# Patient Record
Sex: MALE | Race: WHITE | URBAN - METROPOLITAN AREA
[De-identification: names, ages, dates, MRNs, and addresses within clinical notes are randomized per-mention and may not be internally consistent; named-entity substitution may affect disease eponyms.]

---

## 2018-05-02 ENCOUNTER — OFFICE VISIT (OUTPATIENT)
Dept: FAMILY MEDICINE CLINIC | Age: 48
End: 2018-05-02

## 2018-05-02 VITALS
RESPIRATION RATE: 20 BRPM | HEIGHT: 60 IN | TEMPERATURE: 98.3 F | BODY MASS INDEX: 38.68 KG/M2 | DIASTOLIC BLOOD PRESSURE: 80 MMHG | OXYGEN SATURATION: 97 % | HEART RATE: 75 BPM | WEIGHT: 197 LBS | SYSTOLIC BLOOD PRESSURE: 134 MMHG

## 2018-05-02 DIAGNOSIS — G89.29 CHRONIC PAIN OF LEFT KNEE: Primary | ICD-10-CM

## 2018-05-02 DIAGNOSIS — M25.562 CHRONIC PAIN OF LEFT KNEE: Primary | ICD-10-CM

## 2018-05-02 PROBLEM — E66.01 OBESITY, MORBID (HCC): Status: ACTIVE | Noted: 2018-05-02

## 2018-05-02 RX ORDER — ALPRAZOLAM 2 MG/1
TABLET ORAL
Refills: 0 | COMMUNITY
Start: 2018-03-18 | End: 2018-05-02 | Stop reason: ALTCHOICE

## 2018-05-02 RX ORDER — HYDROCODONE BITARTRATE AND ACETAMINOPHEN 7.5; 325 MG/1; MG/1
1 TABLET ORAL
Qty: 14 TAB | Refills: 0 | Status: SHIPPED | OUTPATIENT
Start: 2018-05-02

## 2018-05-02 NOTE — MR AVS SNAPSHOT
[facility] 
 
 
 [address] [facility] [place] [address] [address] [facility] 
[phone] Patient: Isrrael Coffman MRN: [id number] AWW:[id number] Visit Information Date & Time Provider Department Dept. Phone Encounter #  
 5/2/2018  2:00 PM Gurpreet Philippe, [address] [place] [address] [phone] [id number] Upcoming Health Maintenance Date Due DTaP/Tdap/Td series (1 - Tdap) 7/3/1992 Influenza Age 5 to Adult 8/1/2018 Allergies as of 5/2/2018  Review Complete On: 5/2/2018 By: Gurpreet Philippe MD  
 No Known Allergies Current Immunizations  Never Reviewed No immunizations on file. Not reviewed this visit You Were Diagnosed With   
  
 Codes Comments Chronic pain of left knee    -  Primary ICD-10-CM: M25.562, U11.53 ICD-9-CM: 719.46, 338.29 Vitals BP Pulse Temp Resp Height(growth percentile) Weight(growth percentile) 134/80 75 98.3 °F (36.8 °C) (Oral) 20 4' 10\" (1.473 m) 197 lb (89.4 kg) SpO2 BMI Smoking Status 97% 41.17 kg/m2 Never Smoker Vitals History BMI and BSA Data Body Mass Index Body Surface Area  
 41.17 kg/m 2 1.91 m 2 Preferred Pharmacy Pharmacy Name Phone [facility] [address] [facility] [phone] Your Updated Medication List  
  
   
This list is accurate as of 5/2/18  2:51 PM.  Always use your most recent med list.  
  
  
  
  
 HYDROcodone-acetaminophen 7.5-325 mg per tablet Commonly known as:  Cierra Kike Take 1 Tab by mouth every twelve (12) hours as needed for Pain. Max Daily Amount: 2 Tabs. Prescriptions Printed Refills HYDROcodone-acetaminophen (NORCO) 7.5-325 mg per tablet 0 Sig: Take 1 Tab by mouth every twelve (12) hours as needed for Pain. Max Daily Amount: 2 Tabs. Class: Print Route: Oral  
  
Introducing [facility]!    
 Kip Diez introduces [employer] patient portal. Now you can access parts of your medical record, email your doctor's office, and request medication refills online. 1. In your internet browser, go to [email or web address] 2. Click on the First Time User? Click Here link in the Sign In box. You will see the New Member Sign Up page. 3. Enter your Deanslist Access Code exactly as it appears below. You will not need to use this code after youve completed the sign-up process. If you do not sign up before the expiration date, you must request a new code. · Deanslist Access Code: [email or web address] Expires: 7/31/2018  2:05 PM 
 
4. Enter the last four digits of your Social Security Number (xxxx) and Date of Birth (mm/dd/yyyy) as indicated and click Submit. You will be taken to the next sign-up page. 5. Create a Deanslist ID. This will be your Deanslist login ID and cannot be changed, so think of one that is secure and easy to remember. 6. Create a Deanslist password. You can change your password at any time. 7. Enter your Password Reset Question and Answer. This can be used at a later time if you forget your password. 8. Enter your e-mail address. You will receive e-mail notification when new information is available in [address] 9. Click Sign Up. You can now view and download portions of your medical record. 10. Click the Download Summary menu link to download a portable copy of your medical information. If you have questions, please visit the Frequently Asked Questions section of the Deanslist website. Remember, Deanslist is NOT to be used for urgent needs. For medical emergencies, dial 911. Now available from your iPhone and Android! Please provide this summary of care documentation to your next provider. Your primary care clinician is listed as Adriana Mcpherson. If you have any questions after today's visit, please call [phone].

## 2018-05-02 NOTE — PROGRESS NOTES
Chief Complaint   Patient presents with    Well Male     1. Have you been to the ER, urgent care clinic since your last visit? Hospitalized since your last visit? No    2. Have you seen or consulted any other health care providers outside of the [address] [place] [address] since your last visit? Include any pap smears or colon screening. No      Subjective:   Pernell Rincon is a 56 y.o. male presenting for his    Pt has had chronic knee pain; he has had surgery on the knee; He has been on chronic vicodin, xanax and an nflammatory. He takes tylenol and ibuprofen; He does not desire surgery; His last xanax rx was filled 3/18/2018; with a disp qty of #60 ; He has been using his mom's xanax. His blood pressure is up; he thinks this is due to     ROS: No dyspnea or chest pain on exertion. No abdominal pain, change in bowel habits, black or bloody stools. No urinary tract or prostatic symptoms. No neurological complaints. Specific concerns today: wants his meds as prescribed by his previous MD ; Pt advised that this provider could not prescribe both xanax and narcotics. Advised that a pain management referral would be in order;. Risk of benzodiazepines and narcotic meds reviewed. Patient Active Problem List    Diagnosis Date Noted    Obesity, morbid ([facility] [address] 05/02/2018    Anxiety     Testicle pain 05/04/2015    Penile lesion 05/04/2015    Left knee pain      Current Outpatient Prescriptions   Medication Sig Dispense Refill    HYDROcodone-acetaminophen (NORCO) 7.5-325 mg per tablet Take 1 Tab by mouth every twelve (12) hours as needed for Pain. Max Daily Amount: 2 Tabs.  14 Tab 0     No Known Allergies  Past Medical History:   Diagnosis Date    Anxiety     Hypercholesterolemia     on diet    Left knee pain     Obesity     Osteoarthritis of left knee      Past Surgical History:   Procedure Laterality Date    HX OSTEOTOMY  2007    High tibial osteotomy with external fixtion     Family History   Problem Relation Age of Onset    Diabetes Father             Objective:     Visit Vitals    /80    Pulse 75    Temp 98.3 °F (36.8 °C) (Oral)    Resp 20    Ht 4' 10\" (1.473 m)    Wt 197 lb (89.4 kg)    SpO2 97%    BMI 41.17 kg/m2     The patient appears well, alert, oriented x 3, in no distress. ANTONIA. Lungs are clear, good air entry, no wheezes, rhonchi or rales. S1 and S2 normal, no murmurs, regular rate and rhythm  Extremities show no edema,. Neurological is normal without gross focal findings. Assessment/Plan:     . ICD-10-CM ICD-9-CM    1. Chronic pain of left knee M25.562 719.46 HYDROcodone-acetaminophen (NORCO) 7.5-325 mg per tablet    G89.29 338.29 REFERRAL TO PAIN MANAGEMENT   . As above, new to this provider  Pt has elected to use norco; temporary only rx given. No benzodiazepine prescribed today' advised pt not take benzodiazepine with norco  Pain management referral  Follow-up Disposition:  Return if symptoms worsen or fail to improve.

## 2022-03-19 PROBLEM — E66.01 OBESITY, MORBID (HCC): Status: ACTIVE | Noted: 2018-05-02

## 2022-12-10 ENCOUNTER — HOSPITAL ENCOUNTER (EMERGENCY)
Age: 52
Discharge: HOME | End: 2022-12-10
Attending: EMERGENCY MEDICINE

## 2022-12-10 VITALS
WEIGHT: 230 LBS | OXYGEN SATURATION: 98 % | TEMPERATURE: 97.4 F | SYSTOLIC BLOOD PRESSURE: 152 MMHG | DIASTOLIC BLOOD PRESSURE: 69 MMHG | HEIGHT: 68 IN | HEART RATE: 61 BPM | BODY MASS INDEX: 34.86 KG/M2 | RESPIRATION RATE: 18 BRPM

## 2022-12-10 DIAGNOSIS — K08.89 PAIN, DENTAL: Primary | ICD-10-CM

## 2022-12-10 PROCEDURE — 74011250637 HC RX REV CODE- 250/637: Performed by: EMERGENCY MEDICINE

## 2022-12-10 PROCEDURE — 99283 EMERGENCY DEPT VISIT LOW MDM: CPT

## 2022-12-10 RX ORDER — HYDROCODONE BITARTRATE AND ACETAMINOPHEN 5; 325 MG/1; MG/1
1 TABLET ORAL
Qty: 12 TABLET | Refills: 0 | Status: SHIPPED | OUTPATIENT
Start: 2022-12-10 | End: 2022-12-13

## 2022-12-10 RX ORDER — HYDROCODONE BITARTRATE AND ACETAMINOPHEN 5; 325 MG/1; MG/1
2 TABLET ORAL
Status: COMPLETED | OUTPATIENT
Start: 2022-12-10 | End: 2022-12-10

## 2022-12-10 RX ORDER — PENICILLIN V POTASSIUM 500 MG/1
500 TABLET, FILM COATED ORAL 4 TIMES DAILY
Qty: 28 TABLET | Refills: 0 | Status: SHIPPED | OUTPATIENT
Start: 2022-12-10 | End: 2022-12-17

## 2022-12-10 RX ORDER — IBUPROFEN 600 MG/1
600 TABLET ORAL
Qty: 20 TABLET | Refills: 0 | Status: SHIPPED | OUTPATIENT
Start: 2022-12-10

## 2022-12-10 RX ORDER — PENICILLIN V POTASSIUM 250 MG/1
500 TABLET, FILM COATED ORAL
Status: COMPLETED | OUTPATIENT
Start: 2022-12-10 | End: 2022-12-10

## 2022-12-10 RX ADMIN — PENICILLIN V POTASSIUM 500 MG: 250 TABLET, FILM COATED ORAL at 07:58

## 2022-12-10 RX ADMIN — HYDROCODONE BITARTRATE AND ACETAMINOPHEN 2 TABLET: 5; 325 TABLET ORAL at 07:58

## 2022-12-10 NOTE — ED NOTES
Pt medicated per [facility], discharge instructions provided. Pt denies any questions.

## 2022-12-10 NOTE — ED PROVIDER NOTES
The patient is a 30-year-old male who denies any past medical history, who presents the ED today with dental pain. He has pain in his right upper jaw. It has been present for the past 2 to 3 days. He states that it has been present for the past 2 to 3 days but continues to get worse. He does have a dentist appointment on Monday but the pain was severe enough that he needed to go to the ED today. Past Medical History:   Diagnosis Date    Anxiety     Hypercholesterolemia     on diet    Left knee pain     Obesity     Osteoarthritis of left knee        Past Surgical History:   Procedure Laterality Date    HX OSTEOTOMY  2007    High tibial osteotomy with external fixtion         Family History:   Problem Relation Age of Onset    Diabetes Father        Social History     Socioeconomic History    Marital status: SINGLE     Spouse name: Not on file    Number of children: Not on file    Years of education: Not on file    Highest education level: Not on file   Occupational History    Not on file   Tobacco Use    Smoking status: Never    Smokeless tobacco: Not on file   Substance and Sexual Activity    Alcohol use: No    Drug use: Yes     Types: IV    Sexual activity: Not on file   Other Topics Concern    Not on file   Social History Narrative    Not on file     Social Determinants of Health     Financial Resource Strain: Not on file   Food Insecurity: Not on file   Transportation Needs: Not on file   Physical Activity: Not on file   Stress: Not on file   Social Connections: Not on file   Intimate Partner Violence: Not on file   Housing Stability: Not on file         ALLERGIES: Patient has no known allergies. Review of Systems   All other systems reviewed and are negative. Vitals:    12/10/22 0644   BP: (!) 152/69   Pulse: 61   Resp: 18   Temp: 97.4 °F (36.3 °C)   SpO2: 98%   Weight: 104.3 kg (230 lb)   Height: 5' 8\" (1.727 m)            Physical Exam  Vitals and nursing note reviewed.    Constitutional: Appearance: Normal appearance. HENT:      Head: Normocephalic and atraumatic. Right Ear: External ear normal.      Left Ear: External ear normal.      Nose: Nose normal.      Mouth/Throat:      Mouth: Mucous membranes are moist.      Pharynx: Oropharynx is clear. Comments: Tooth #1 and tooth #2 are absent. Tooth #3 is fractured. Tooth #3 has tenderness to palpation. There is no surrounding gumline tenderness to palpation or fluctuance. There is no overlying soft tissue tenderness to palpation or erythema. Eyes:      Extraocular Movements: Extraocular movements intact. Conjunctiva/sclera: Conjunctivae normal.      Pupils: Pupils are equal, round, and reactive to light. Cardiovascular:      Rate and Rhythm: Normal rate and regular rhythm. Pulses: Normal pulses. Heart sounds: Normal heart sounds. Pulmonary:      Effort: Pulmonary effort is normal.      Breath sounds: Normal breath sounds. Abdominal:      General: Abdomen is flat. Bowel sounds are normal.      Palpations: Abdomen is soft. Musculoskeletal:         General: Normal range of motion. Cervical back: Normal range of motion and neck supple. Skin:     General: Skin is warm and dry. Capillary Refill: Capillary refill takes less than 2 seconds. Neurological:      General: No focal deficit present. Mental Status: He is alert and oriented to person, place, and time. Psychiatric:         Mood and Affect: Mood normal.         Behavior: Behavior normal.         Thought Content: Thought content normal.         Judgment: Judgment normal.        MDM  Number of Diagnoses or Management Options  Diagnosis management comments: The patient is a 60-year-old male who denies any past medical history, who presents to the ED today with right upper jaw dental pain. I believe that tooth #3 is the culprit. He has received Norco and penicillin in the ED.   He will be discharged home with prescriptions for Norco, penicillin, and Motrin. He will be advised to follow-up with a dentist on Monday as previously scheduled. Return precautions have been given.            Procedures

## 2023-10-21 ENCOUNTER — HOSPITAL ENCOUNTER (EMERGENCY)
Facility: HOSPITAL | Age: 53
Discharge: HOME | End: 2023-10-21
Attending: EMERGENCY MEDICINE
Payer: MEDICAID

## 2023-10-21 VITALS
WEIGHT: 240 LBS | TEMPERATURE: 97.7 F | DIASTOLIC BLOOD PRESSURE: 88 MMHG | HEIGHT: 68 IN | SYSTOLIC BLOOD PRESSURE: 145 MMHG | BODY MASS INDEX: 36.37 KG/M2 | OXYGEN SATURATION: 98 % | RESPIRATION RATE: 16 BRPM | HEART RATE: 61 BPM

## 2023-10-21 DIAGNOSIS — H81.10 BENIGN PAROXYSMAL POSITIONAL VERTIGO, UNSPECIFIED LATERALITY: Primary | ICD-10-CM

## 2023-10-21 PROCEDURE — 6370000000 HC RX 637 (ALT 250 FOR IP): Performed by: EMERGENCY MEDICINE

## 2023-10-21 PROCEDURE — 99283 EMERGENCY DEPT VISIT LOW MDM: CPT

## 2023-10-21 RX ORDER — MECLIZINE HCL 12.5 MG/1
25 TABLET ORAL
Status: COMPLETED | OUTPATIENT
Start: 2023-10-21 | End: 2023-10-21

## 2023-10-21 RX ORDER — MECLIZINE HYDROCHLORIDE 25 MG/1
25 TABLET ORAL 3 TIMES DAILY PRN
Qty: 15 TABLET | Refills: 0 | Status: SHIPPED | OUTPATIENT
Start: 2023-10-21 | End: 2023-10-31

## 2023-10-21 RX ADMIN — MECLIZINE 25 MG: 12.5 TABLET ORAL at 19:29

## 2023-10-21 ASSESSMENT — PAIN - FUNCTIONAL ASSESSMENT: PAIN_FUNCTIONAL_ASSESSMENT: NONE - DENIES PAIN

## 2023-10-21 NOTE — ED PROVIDER NOTES
EMERGENCY DEPARTMENT HISTORY AND PHYSICAL EXAM    7:02 PM EDT seen at this time in room F3        Date: 10/21/2023  Patient Name: Nevin García    History of Presenting Illness     No chief complaint on file. History Provided By: patient    Additional History (Context): Nevin García is a 47 y.o. male presents with patient his last encounter for routine medical care was 2008, and has a history of recurrent vertigo. He is never had treatment for it. He now has 3 days of on and off episodes clearly worse with sitting upright, better with lying flat staying still and closing his eyes. He has some discomfort in his left ear but no acute pain does note some subjective hearing loss. No acute change there. Yesterday had some room spinning but no vomiting it subsided this morning has more room spinning with vomiting. He does not recall ever trying meclizine. Not diabetic. Aliyah Mckay PCP: Marsha Vo MD    Chief Complaint:   Duration:    Timing:    Location:   Quality:   Severity:   Modifying Factors:   Associated Symptoms:       No current facility-administered medications for this encounter. Current Outpatient Medications   Medication Sig Dispense Refill    meclizine (ANTIVERT) 25 MG tablet Take 1 tablet by mouth 3 times daily as needed for Dizziness 15 tablet 0    ibuprofen (ADVIL;MOTRIN) 600 MG tablet Take 600 mg by mouth every 8 hours as needed         Past History     Past Medical History:  Past Medical History:   Diagnosis Date    Anxiety     Hypercholesterolemia     on diet    Left knee pain     Obesity     Osteoarthritis of left knee        Past Surgical History:  Past Surgical History:   Procedure Laterality Date    OSTEOTOMY  2007    High tibial osteotomy with external fixtion       Family History:  Family History   Problem Relation Age of Onset    Diabetes Father        Social History:  Social History     Tobacco Use    Smoking status: Never   Substance Use Topics    Alcohol use:  No MG tablet Take 600 mg by mouth every 8 hours as needed             DISCONTINUED MEDICATIONS:  Current Discharge Medication List          PATIENT REFERRED TO:  Follow Up with:  Estephania Bhatia MD  [address] [facility]  [phone]    In 1 week      Yan Timmons MD  [facility], [address] [place] [id number] [address]    In 1 week      Valeria Arciniega MD  [address] [place] [address]  [phone]    In 1 week      _______________________________    Notes:    Cindy Barthel, MD using Dragon dictation      _______________________________     Caleb Flores MD  10/21/23 2010

## 2023-10-22 NOTE — ED NOTES
I have reviewed discharge instructions and medications with patient. Patient verbalized understanding.       Melvena Kanner, RN  10/21/23 2014

## 2024-02-08 NOTE — PROGRESS NOTES
Patient: Silvio Arizmendi                MRN: [id number]       SSN: [id number]  YOB: 1970        AGE: 53 y.o.        SEX: male      PCP: Juana Torres MD  02/09/24    Chief Complaint   Patient presents with    Knee Pain     left     HISTORY:  Silvio Arizmendi is a 53 y.o. male referred by Dr. Alexandra for several year history of left knee pain. He denies any recent injury.  He feels pain with standing, walking and stair climbing.  He experiences startup pain after sitting.  His mostly experiences pain at night. He has to ice his knee when he gets home from work. He has difficulty with bending and straightening his knee. He has not responded to OTC Ibuprofen. He is not interested in surgery because he is on his knees a lot at work as a self-employed .     He notes a prior left knee injury in 2008. He underwent tibial osteotomy with ex fix and ACL reconstruction at [facility] by Dr. Painter in 2008.     Occupation, etc: Mr. 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 He wears kneepads at work. He lives with his girlfriend in [place]. He has 2 adult sons, 1 adult daughter, 2 granddaughters, and 2 grandsons.   Wt Readings from Last 3 Encounters:   02/09/24 104.3 kg (230 lb)   10/21/23 108.9 kg (240 lb)      Body mass index is 34.97 kg/m².    Patient Active Problem List   Diagnosis    Left knee pain    Testicle pain    Penile lesion    Anxiety    Obesity, morbid (HCC)       Social History     Tobacco Use    Smoking status: Never   Substance Use Topics    Alcohol use: No    Drug use: Not Currently        Allergies   Allergen Reactions    Doxycycline Angioedema        Current Outpatient Medications   Medication Sig    ibuprofen (ADVIL;MOTRIN) 600 MG tablet Take 600 mg by mouth every 8 hours as needed (Patient not taking: Reported on 2/9/2024)     No current facility-administered medications for this visit.        PHYSICAL

## 2024-02-09 ENCOUNTER — OFFICE VISIT (OUTPATIENT)
Age: 54
End: 2024-02-09

## 2024-02-09 VITALS — TEMPERATURE: 98.5 F | WEIGHT: 230 LBS | BODY MASS INDEX: 34.86 KG/M2 | HEIGHT: 68 IN

## 2024-02-09 DIAGNOSIS — M71.22 POPLITEAL CYST, LEFT: ICD-10-CM

## 2024-02-09 DIAGNOSIS — M25.562 LEFT KNEE PAIN, UNSPECIFIED CHRONICITY: Primary | ICD-10-CM

## 2024-02-09 DIAGNOSIS — Z98.890 H/O RECONSTRUCTION OF ANTERIOR CRUCIATE LIGAMENT TEAR: ICD-10-CM

## 2024-02-09 DIAGNOSIS — M17.32 POST-TRAUMATIC OSTEOARTHRITIS OF LEFT KNEE: ICD-10-CM

## 2024-02-09 RX ORDER — BUPIVACAINE HYDROCHLORIDE 5 MG/ML
4 INJECTION, SOLUTION PERINEURAL ONCE
Status: COMPLETED | OUTPATIENT
Start: 2024-02-09 | End: 2024-02-09

## 2024-02-09 RX ORDER — BETAMETHASONE SODIUM PHOSPHATE AND BETAMETHASONE ACETATE 3; 3 MG/ML; MG/ML
3 INJECTION, SUSPENSION INTRA-ARTICULAR; INTRALESIONAL; INTRAMUSCULAR; SOFT TISSUE ONCE
Status: COMPLETED | OUTPATIENT
Start: 2024-02-09 | End: 2024-02-09

## 2024-02-09 RX ADMIN — BETAMETHASONE SODIUM PHOSPHATE AND BETAMETHASONE ACETATE 3 MG: 3; 3 INJECTION, SUSPENSION INTRA-ARTICULAR; INTRALESIONAL; INTRAMUSCULAR; SOFT TISSUE at 14:12

## 2024-02-09 RX ADMIN — BUPIVACAINE HYDROCHLORIDE 20 MG: 5 INJECTION, SOLUTION PERINEURAL at 14:12

## 2024-04-30 ENCOUNTER — HOSPITAL ENCOUNTER (EMERGENCY)
Facility: HOSPITAL | Age: 54
Discharge: HOME | End: 2024-04-30
Payer: COMMERCIAL

## 2024-04-30 VITALS
TEMPERATURE: 98.3 F | DIASTOLIC BLOOD PRESSURE: 87 MMHG | WEIGHT: 240 LBS | HEART RATE: 71 BPM | SYSTOLIC BLOOD PRESSURE: 136 MMHG | HEIGHT: 68 IN | RESPIRATION RATE: 17 BRPM | BODY MASS INDEX: 36.37 KG/M2 | OXYGEN SATURATION: 98 %

## 2024-04-30 DIAGNOSIS — K02.9 PAIN DUE TO DENTAL CARIES: Primary | ICD-10-CM

## 2024-04-30 PROCEDURE — 99283 EMERGENCY DEPT VISIT LOW MDM: CPT

## 2024-04-30 RX ORDER — AMOXICILLIN 500 MG/1
500 CAPSULE ORAL 3 TIMES DAILY
Qty: 30 CAPSULE | Refills: 0 | Status: SHIPPED | OUTPATIENT
Start: 2024-04-30 | End: 2024-05-10

## 2024-04-30 RX ORDER — CHLORHEXIDINE GLUCONATE ORAL RINSE 1.2 MG/ML
15 SOLUTION DENTAL 2 TIMES DAILY
Qty: 420 ML | Refills: 0 | Status: SHIPPED | OUTPATIENT
Start: 2024-04-30 | End: 2024-05-14

## 2024-04-30 RX ORDER — LIDOCAINE HYDROCHLORIDE 20 MG/ML
15 SOLUTION OROPHARYNGEAL
Qty: 100 ML | Refills: 0 | Status: SHIPPED | OUTPATIENT
Start: 2024-04-30

## 2024-04-30 ASSESSMENT — PAIN - FUNCTIONAL ASSESSMENT
PAIN_FUNCTIONAL_ASSESSMENT: 0-10
PAIN_FUNCTIONAL_ASSESSMENT: NONE - DENIES PAIN

## 2024-04-30 ASSESSMENT — PAIN SCALES - GENERAL: PAINLEVEL_OUTOF10: 10

## 2024-04-30 ASSESSMENT — PAIN DESCRIPTION - LOCATION: LOCATION: TEETH

## 2024-04-30 NOTE — DISCHARGE INSTRUCTIONS
Return to ED or schedule visit with PCP if signs of infection such as fast heart rate or fever develop

## 2024-04-30 NOTE — ED PROVIDER NOTES
Allergen Reactions    Doxycycline Angioedema         Review of Systems   Review of Systems   Constitutional:  Negative for activity change.   HENT:  Positive for dental problem. Negative for congestion, drooling, ear discharge, ear pain, facial swelling, hearing loss, mouth sores, nosebleeds, postnasal drip, rhinorrhea, sinus pressure, sinus pain, sneezing, sore throat, tinnitus, trouble swallowing and voice change.    Respiratory:  Negative for shortness of breath.    Cardiovascular:  Negative for chest pain and leg swelling.   Gastrointestinal:  Negative for diarrhea and vomiting.         Physical Exam   Physical Exam  Vitals and nursing note reviewed.   Constitutional:       General: He is not in acute distress.     Appearance: Normal appearance. He is not ill-appearing, toxic-appearing or diaphoretic.   HENT:      Head: Normocephalic and atraumatic.      Mouth/Throat:      Pharynx: No oropharyngeal exudate or posterior oropharyngeal erythema.      Comments: Uvula is midline, no tonsillar edema, no soft tissue swelling in the mouth or along the gumline, severe dental caries with pulp exposure present on the right maxillary and mandibular molars.  Tender to percussion.  No halitosis or gum bleeding, no gray pseudomembrane.  No submandibular lymphadenopathy, full active range of motion with neck and jaw. No apparent distress.      Cardiovascular:      Rate and Rhythm: Normal rate and regular rhythm.   Pulmonary:      Effort: Pulmonary effort is normal.      Breath sounds: Normal breath sounds.   Musculoskeletal:         General: Normal range of motion.      Cervical back: Normal range of motion and neck supple.   Skin:     General: Skin is warm.      Findings: No rash.   Neurological:      General: No focal deficit present.      Mental Status: He is alert and oriented to person, place, and time.      Cranial Nerves: No cranial nerve deficit.      Sensory: No sensory deficit.      Motor: No weakness.

## 2024-04-30 NOTE — ED TRIAGE NOTES
Patient presented to the Emergency Dept with a complaint of dental pain and swelling to right area of mouth X 4 days.    Patient states that he tried his dentist however, appointment is too far and the pain is getting worse.    Patient rates pain 10/10 on pain scale    Patient alert and oriented x 4, patient breathes freely on room air in nil cardiopulmonary distress

## 2024-05-01 ASSESSMENT — ENCOUNTER SYMPTOMS
VOMITING: 0
RHINORRHEA: 0
VOICE CHANGE: 0
SINUS PRESSURE: 0
SHORTNESS OF BREATH: 0
FACIAL SWELLING: 0
SINUS PAIN: 0
TROUBLE SWALLOWING: 0
DIARRHEA: 0
SORE THROAT: 0

## 2024-05-13 ENCOUNTER — HOSPITAL ENCOUNTER (OUTPATIENT)
Facility: HOSPITAL | Age: 54
Discharge: HOME | End: 2024-05-16
Attending: OTOLARYNGOLOGY
Payer: COMMERCIAL

## 2024-05-13 DIAGNOSIS — H90.42 SENSORINEURAL HEARING LOSS, UNILATERAL, LEFT EAR, WITH UNRESTRICTED HEARING ON THE CONTRALATERAL SIDE: ICD-10-CM

## 2024-05-13 PROCEDURE — A9577 INJ MULTIHANCE: HCPCS | Performed by: OTOLARYNGOLOGY

## 2024-05-13 PROCEDURE — 6360000004 HC RX CONTRAST MEDICATION: Performed by: OTOLARYNGOLOGY

## 2024-05-13 PROCEDURE — 70553 MRI BRAIN STEM W/O & W/DYE: CPT

## 2024-05-13 RX ADMIN — GADOBENATE DIMEGLUMINE 20 ML: 529 INJECTION, SOLUTION INTRAVENOUS at 14:38

## 2024-10-14 ENCOUNTER — HOSPITAL ENCOUNTER (EMERGENCY)
Facility: HOSPITAL | Age: 54
Discharge: HOME | End: 2024-10-14
Payer: COMMERCIAL

## 2024-10-14 VITALS
BODY MASS INDEX: 34.86 KG/M2 | SYSTOLIC BLOOD PRESSURE: 135 MMHG | HEART RATE: 58 BPM | WEIGHT: 230 LBS | TEMPERATURE: 97.7 F | OXYGEN SATURATION: 98 % | HEIGHT: 68 IN | DIASTOLIC BLOOD PRESSURE: 119 MMHG | RESPIRATION RATE: 20 BRPM

## 2024-10-14 DIAGNOSIS — K08.89 PAIN, DENTAL: Primary | ICD-10-CM

## 2024-10-14 PROCEDURE — 6370000000 HC RX 637 (ALT 250 FOR IP): Performed by: PHYSICIAN ASSISTANT

## 2024-10-14 PROCEDURE — 99283 EMERGENCY DEPT VISIT LOW MDM: CPT

## 2024-10-14 RX ORDER — LIDOCAINE HYDROCHLORIDE 20 MG/ML
15 SOLUTION OROPHARYNGEAL PRN
Qty: 100 ML | Refills: 0 | Status: SHIPPED | OUTPATIENT
Start: 2024-10-14

## 2024-10-14 RX ORDER — HYDROCODONE BITARTRATE AND ACETAMINOPHEN 5; 325 MG/1; MG/1
1 TABLET ORAL
Status: COMPLETED | OUTPATIENT
Start: 2024-10-14 | End: 2024-10-14

## 2024-10-14 RX ORDER — HYDROCODONE BITARTRATE AND ACETAMINOPHEN 5; 325 MG/1; MG/1
1 TABLET ORAL EVERY 6 HOURS PRN
Qty: 8 TABLET | Refills: 0 | Status: SHIPPED | OUTPATIENT
Start: 2024-10-14 | End: 2024-10-17

## 2024-10-14 RX ADMIN — HYDROCODONE BITARTRATE AND ACETAMINOPHEN 1 TABLET: 5; 325 TABLET ORAL at 19:52

## 2024-10-14 ASSESSMENT — ENCOUNTER SYMPTOMS
SHORTNESS OF BREATH: 0
VOMITING: 0
ABDOMINAL PAIN: 0
DIARRHEA: 0
NAUSEA: 0

## 2024-10-14 ASSESSMENT — PAIN DESCRIPTION - LOCATION: LOCATION: TEETH

## 2024-10-14 ASSESSMENT — PAIN SCALES - GENERAL: PAINLEVEL_OUTOF10: 8

## 2024-10-14 ASSESSMENT — PAIN - FUNCTIONAL ASSESSMENT: PAIN_FUNCTIONAL_ASSESSMENT: 0-10

## 2024-10-14 ASSESSMENT — PAIN DESCRIPTION - ORIENTATION: ORIENTATION: RIGHT

## 2024-10-14 NOTE — ED TRIAGE NOTES
Patient has c/o dental pain [facility] medication and it make him throw up

## 2024-10-15 NOTE — ED PROVIDER NOTES
EMERGENCY DEPARTMENT HISTORY AND PHYSICAL EXAM    8:23 PM      Date: 10/14/2024  Patient Name: Silvio Arizmendi    History of Presenting Illness     Chief Complaint   Patient presents with    Dental Pain         History Provided By: Patient    Additional History (Context): Silvio Arizmendi is a 54 y.o. male with   Past Medical History:   Diagnosis Date    Anxiety     Hypercholesterolemia     on diet    Left knee pain     Obesity     Osteoarthritis of left knee    } who presents with complaint of right lower dental pain x days.  [facility] Augmentin and Diclofenac which he has been taking without relief. Requesting pain medicine today. Notes he has outpatient follow-up with dentist established. Pt denies fever or chills, facial swelling, drooling or stridor.    PCP: Juana Torres MD    No current facility-administered medications for this encounter.     Current Outpatient Medications   Medication Sig Dispense Refill    lidocaine viscous hcl (XYLOCAINE) 2 % SOLN solution Take 15 mLs by mouth as needed for Irritation 100 mL 0    HYDROcodone-acetaminophen (NORCO) 5-325 MG per tablet Take 1 tablet by mouth every 6 hours as needed for Pain for up to 3 days. Intended supply: 3 days. Take lowest dose possible to manage pain Max Daily Amount: 4 tablets 8 tablet 0       Past History     Past Medical History:  Past Medical History:   Diagnosis Date    Anxiety     Hypercholesterolemia     on diet    Left knee pain     Obesity     Osteoarthritis of left knee        Past Surgical History:  Past Surgical History:   Procedure Laterality Date    OSTEOTOMY  2007    High tibial osteotomy with external fixtion       Family History:  Family History   Problem Relation Age of Onset    Diabetes Father        Social History:  Social History     Tobacco Use    Smoking status: Never   Substance Use Topics    Alcohol use: No    Drug use: Not Currently       Allergies:  Allergies   Allergen